# Patient Record
Sex: FEMALE | Race: WHITE | NOT HISPANIC OR LATINO
[De-identification: names, ages, dates, MRNs, and addresses within clinical notes are randomized per-mention and may not be internally consistent; named-entity substitution may affect disease eponyms.]

---

## 2017-09-27 ENCOUNTER — APPOINTMENT (OUTPATIENT)
Dept: OBGYN | Facility: CLINIC | Age: 37
End: 2017-09-27

## 2017-10-04 ENCOUNTER — APPOINTMENT (OUTPATIENT)
Dept: OBGYN | Facility: CLINIC | Age: 37
End: 2017-10-04
Payer: COMMERCIAL

## 2017-10-04 PROCEDURE — 76831 ECHO EXAM UTERUS: CPT

## 2017-10-04 PROCEDURE — 36415 COLL VENOUS BLD VENIPUNCTURE: CPT

## 2017-10-04 PROCEDURE — 81025 URINE PREGNANCY TEST: CPT

## 2017-10-04 PROCEDURE — 58340 CATHETER FOR HYSTEROGRAPHY: CPT

## 2017-11-28 ENCOUNTER — RESULT REVIEW (OUTPATIENT)
Age: 37
End: 2017-11-28

## 2017-11-29 ENCOUNTER — APPOINTMENT (OUTPATIENT)
Dept: OBGYN | Facility: CLINIC | Age: 37
End: 2017-11-29
Payer: COMMERCIAL

## 2017-11-29 PROCEDURE — 99395 PREV VISIT EST AGE 18-39: CPT

## 2018-01-08 ENCOUNTER — APPOINTMENT (OUTPATIENT)
Dept: OBGYN | Facility: CLINIC | Age: 38
End: 2018-01-08
Payer: COMMERCIAL

## 2018-01-08 PROCEDURE — 99213 OFFICE O/P EST LOW 20 MIN: CPT

## 2018-01-17 ENCOUNTER — APPOINTMENT (OUTPATIENT)
Dept: OBGYN | Facility: CLINIC | Age: 38
End: 2018-01-17

## 2018-01-25 ENCOUNTER — APPOINTMENT (OUTPATIENT)
Dept: OBGYN | Facility: CLINIC | Age: 38
End: 2018-01-25
Payer: COMMERCIAL

## 2018-01-25 PROCEDURE — 76831 ECHO EXAM UTERUS: CPT

## 2018-01-25 PROCEDURE — 81025 URINE PREGNANCY TEST: CPT

## 2018-01-25 PROCEDURE — 36415 COLL VENOUS BLD VENIPUNCTURE: CPT

## 2018-01-25 PROCEDURE — 58340 CATHETER FOR HYSTEROGRAPHY: CPT

## 2018-02-16 ENCOUNTER — APPOINTMENT (OUTPATIENT)
Dept: OBGYN | Facility: CLINIC | Age: 38
End: 2018-02-16

## 2018-05-03 ENCOUNTER — OUTPATIENT (OUTPATIENT)
Dept: OUTPATIENT SERVICES | Facility: HOSPITAL | Age: 38
LOS: 1 days | End: 2018-05-03
Payer: COMMERCIAL

## 2018-05-03 VITALS
HEIGHT: 59.5 IN | HEART RATE: 82 BPM | TEMPERATURE: 98 F | SYSTOLIC BLOOD PRESSURE: 103 MMHG | DIASTOLIC BLOOD PRESSURE: 69 MMHG | WEIGHT: 130.95 LBS | OXYGEN SATURATION: 98 % | RESPIRATION RATE: 16 BRPM

## 2018-05-03 DIAGNOSIS — N84.0 POLYP OF CORPUS UTERI: ICD-10-CM

## 2018-05-03 DIAGNOSIS — Z01.818 ENCOUNTER FOR OTHER PREPROCEDURAL EXAMINATION: ICD-10-CM

## 2018-05-03 DIAGNOSIS — N92.0 EXCESSIVE AND FREQUENT MENSTRUATION WITH REGULAR CYCLE: ICD-10-CM

## 2018-05-03 DIAGNOSIS — Z98.890 OTHER SPECIFIED POSTPROCEDURAL STATES: Chronic | ICD-10-CM

## 2018-05-03 DIAGNOSIS — Z98.891 HISTORY OF UTERINE SCAR FROM PREVIOUS SURGERY: Chronic | ICD-10-CM

## 2018-05-03 DIAGNOSIS — Z86.008 PERSONAL HISTORY OF IN-SITU NEOPLASM OF OTHER SITE: Chronic | ICD-10-CM

## 2018-05-03 LAB
ANION GAP SERPL CALC-SCNC: 11 MMOL/L — SIGNIFICANT CHANGE UP (ref 5–17)
BUN SERPL-MCNC: 14 MG/DL — SIGNIFICANT CHANGE UP (ref 7–23)
CALCIUM SERPL-MCNC: 9.6 MG/DL — SIGNIFICANT CHANGE UP (ref 8.4–10.5)
CHLORIDE SERPL-SCNC: 103 MMOL/L — SIGNIFICANT CHANGE UP (ref 96–108)
CO2 SERPL-SCNC: 23 MMOL/L — SIGNIFICANT CHANGE UP (ref 22–31)
CREAT SERPL-MCNC: 0.78 MG/DL — SIGNIFICANT CHANGE UP (ref 0.5–1.3)
GLUCOSE SERPL-MCNC: 84 MG/DL — SIGNIFICANT CHANGE UP (ref 70–99)
HCT VFR BLD CALC: 36 % — SIGNIFICANT CHANGE UP (ref 34.5–45)
HGB BLD-MCNC: 11.5 G/DL — SIGNIFICANT CHANGE UP (ref 11.5–15.5)
MCHC RBC-ENTMCNC: 27.4 PG — SIGNIFICANT CHANGE UP (ref 27–34)
MCHC RBC-ENTMCNC: 31.9 GM/DL — LOW (ref 32–36)
MCV RBC AUTO: 85.9 FL — SIGNIFICANT CHANGE UP (ref 80–100)
PLATELET # BLD AUTO: 160 K/UL — SIGNIFICANT CHANGE UP (ref 150–400)
POTASSIUM SERPL-MCNC: 4.6 MMOL/L — SIGNIFICANT CHANGE UP (ref 3.5–5.3)
POTASSIUM SERPL-SCNC: 4.6 MMOL/L — SIGNIFICANT CHANGE UP (ref 3.5–5.3)
RBC # BLD: 4.19 M/UL — SIGNIFICANT CHANGE UP (ref 3.8–5.2)
RBC # FLD: 14 % — SIGNIFICANT CHANGE UP (ref 10.3–14.5)
SODIUM SERPL-SCNC: 137 MMOL/L — SIGNIFICANT CHANGE UP (ref 135–145)
WBC # BLD: 4.4 K/UL — SIGNIFICANT CHANGE UP (ref 3.8–10.5)
WBC # FLD AUTO: 4.4 K/UL — SIGNIFICANT CHANGE UP (ref 3.8–10.5)

## 2018-05-03 PROCEDURE — G0463: CPT

## 2018-05-03 PROCEDURE — 80048 BASIC METABOLIC PNL TOTAL CA: CPT

## 2018-05-03 PROCEDURE — 85027 COMPLETE CBC AUTOMATED: CPT

## 2018-05-03 RX ORDER — LIDOCAINE HCL 20 MG/ML
0.2 VIAL (ML) INJECTION ONCE
Qty: 0 | Refills: 0 | Status: DISCONTINUED | OUTPATIENT
Start: 2018-05-10 | End: 2018-05-25

## 2018-05-03 RX ORDER — SODIUM CHLORIDE 9 MG/ML
3 INJECTION INTRAMUSCULAR; INTRAVENOUS; SUBCUTANEOUS EVERY 8 HOURS
Qty: 0 | Refills: 0 | Status: DISCONTINUED | OUTPATIENT
Start: 2018-05-10 | End: 2018-05-25

## 2018-05-03 NOTE — H&P PST ADULT - NSANTHOSAYNRD_GEN_A_CORE
No. MARCEL screening performed.  STOP BANG Legend: 0-2 = LOW Risk; 3-4 = INTERMEDIATE Risk; 5-8 = HIGH Risk

## 2018-05-03 NOTE — H&P PST ADULT - BLOOD AVOIDANCE/RESTRICTIONS, PROFILE
Anesthesia Evaluation     . Pt has had prior anesthetic. Type: General, MAC and Regional           ROS/MED HX    ENT/Pulmonary:     (+)allergic rhinitis, tobacco use, Past use quit 2014 packs/day  , . .    Neurologic:       Cardiovascular:     (+) Dyslipidemia, hypertension--CAD, --stent,1 Drug Eluting Stent .. : . . . :. .      (-) angina and anginaTaking blood thinners: 2014 Stent to circumflex.   METS/Exercise Tolerance:     Hematologic:     (+) Anemia, -      Musculoskeletal:         GI/Hepatic:     (+) GERD       Renal/Genitourinary:         Endo:     (+) Obesity, .      Psychiatric:     (+) psychiatric history anxiety      Infectious Disease:         Malignancy:   (+) Malignancy History of Lung          Other:                     Physical Exam  Normal systems: cardiovascular    Airway   Mallampati: II  TM distance: >3 FB  Neck ROM: full    Dental     Cardiovascular   Rhythm and rate: regular and normal      Pulmonary    breath sounds clear to auscultation                    Anesthesia Plan      History & Physical Review  History and physical reviewed and following examination; no interval change.    ASA Status:  3 .    NPO Status:  > 6 hours    Plan for MAC Reason for MAC:  Deep or markedly invasive procedure (G8)  PONV prophylaxis:  Ondansetron (or other 5HT-3) and Dexamethasone or Solumedrol       Postoperative Care      Consents  Anesthetic plan, risks, benefits and alternatives discussed with:  Patient..                          .   none

## 2018-05-03 NOTE — H&P PST ADULT - HISTORY OF PRESENT ILLNESS
This is a 38 y/o female with  PMH: ('16) Melanoma insitu RUE: excised. Current dx: recurrent Uterine Polyp. Scheduled: D+C/ Diagnostic / Possible Operative Hysteroscopy/ Possible Polypectomy.

## 2018-05-03 NOTE — H&P PST ADULT - PSH
H/O melanoma in situ  2016  RUE  History of  section  '   Status post dilation and curettage  '    Excison Uterine Polyp

## 2018-05-09 ENCOUNTER — TRANSCRIPTION ENCOUNTER (OUTPATIENT)
Age: 38
End: 2018-05-09

## 2018-05-10 ENCOUNTER — OUTPATIENT (OUTPATIENT)
Dept: OUTPATIENT SERVICES | Facility: HOSPITAL | Age: 38
LOS: 1 days | End: 2018-05-10
Payer: COMMERCIAL

## 2018-05-10 ENCOUNTER — RESULT REVIEW (OUTPATIENT)
Age: 38
End: 2018-05-10

## 2018-05-10 ENCOUNTER — APPOINTMENT (OUTPATIENT)
Dept: OBGYN | Facility: HOSPITAL | Age: 38
End: 2018-05-10

## 2018-05-10 VITALS
RESPIRATION RATE: 16 BRPM | TEMPERATURE: 98 F | SYSTOLIC BLOOD PRESSURE: 103 MMHG | HEART RATE: 82 BPM | WEIGHT: 130.95 LBS | HEIGHT: 59.5 IN | DIASTOLIC BLOOD PRESSURE: 69 MMHG | OXYGEN SATURATION: 98 %

## 2018-05-10 VITALS
DIASTOLIC BLOOD PRESSURE: 55 MMHG | RESPIRATION RATE: 15 BRPM | OXYGEN SATURATION: 100 % | SYSTOLIC BLOOD PRESSURE: 112 MMHG | HEART RATE: 94 BPM

## 2018-05-10 DIAGNOSIS — Z98.890 OTHER SPECIFIED POSTPROCEDURAL STATES: Chronic | ICD-10-CM

## 2018-05-10 DIAGNOSIS — N92.0 EXCESSIVE AND FREQUENT MENSTRUATION WITH REGULAR CYCLE: ICD-10-CM

## 2018-05-10 DIAGNOSIS — Z86.008 PERSONAL HISTORY OF IN-SITU NEOPLASM OF OTHER SITE: Chronic | ICD-10-CM

## 2018-05-10 DIAGNOSIS — N84.0 POLYP OF CORPUS UTERI: ICD-10-CM

## 2018-05-10 DIAGNOSIS — Z98.891 HISTORY OF UTERINE SCAR FROM PREVIOUS SURGERY: Chronic | ICD-10-CM

## 2018-05-10 PROCEDURE — 88305 TISSUE EXAM BY PATHOLOGIST: CPT | Mod: 26

## 2018-05-10 PROCEDURE — 58558 HYSTEROSCOPY BIOPSY: CPT

## 2018-05-10 PROCEDURE — 88305 TISSUE EXAM BY PATHOLOGIST: CPT

## 2018-05-10 RX ORDER — CELECOXIB 200 MG/1
200 CAPSULE ORAL ONCE
Qty: 0 | Refills: 0 | Status: DISCONTINUED | OUTPATIENT
Start: 2018-05-10 | End: 2018-05-25

## 2018-05-10 RX ORDER — SODIUM CHLORIDE 9 MG/ML
1000 INJECTION, SOLUTION INTRAVENOUS
Qty: 0 | Refills: 0 | Status: DISCONTINUED | OUTPATIENT
Start: 2018-05-10 | End: 2018-05-25

## 2018-05-10 RX ORDER — FAMOTIDINE 10 MG/ML
0 INJECTION INTRAVENOUS
Qty: 0 | Refills: 0 | COMMUNITY

## 2018-05-10 RX ORDER — ACETAMINOPHEN 500 MG
1000 TABLET ORAL ONCE
Qty: 0 | Refills: 0 | Status: COMPLETED | OUTPATIENT
Start: 2018-05-10 | End: 2018-05-10

## 2018-05-10 RX ORDER — CELECOXIB 200 MG/1
200 CAPSULE ORAL ONCE
Qty: 0 | Refills: 0 | Status: COMPLETED | OUTPATIENT
Start: 2018-05-10 | End: 2018-05-10

## 2018-05-10 RX ORDER — ONDANSETRON 8 MG/1
4 TABLET, FILM COATED ORAL ONCE
Qty: 0 | Refills: 0 | Status: DISCONTINUED | OUTPATIENT
Start: 2018-05-10 | End: 2018-05-25

## 2018-05-10 RX ORDER — OXYCODONE HYDROCHLORIDE 5 MG/1
5 TABLET ORAL ONCE
Qty: 0 | Refills: 0 | Status: DISCONTINUED | OUTPATIENT
Start: 2018-05-10 | End: 2018-05-10

## 2018-05-10 RX ADMIN — Medication 1000 MILLIGRAM(S): at 11:48

## 2018-05-10 RX ADMIN — SODIUM CHLORIDE 3 MILLILITER(S): 9 INJECTION INTRAMUSCULAR; INTRAVENOUS; SUBCUTANEOUS at 11:50

## 2018-05-10 RX ADMIN — CELECOXIB 200 MILLIGRAM(S): 200 CAPSULE ORAL at 11:48

## 2018-05-10 NOTE — ASU DISCHARGE PLAN (ADULT/PEDIATRIC). - NOTIFY
Unable to Urinate/Bleeding that does not stop/Persistent Nausea and Vomiting/Pain not relieved by Medications/GYN Fever>100.4/Inability to Tolerate Liquids or Foods

## 2018-05-10 NOTE — ASU DISCHARGE PLAN (ADULT/PEDIATRIC). - MEDICATION SUMMARY - MEDICATIONS TO STOP TAKING
I will STOP taking the medications listed below when I get home from the hospital:    famotidine 20 mg oral tablet  -- 1 tab HS before and 1 tab am of surgery

## 2018-05-10 NOTE — ASU DISCHARGE PLAN (ADULT/PEDIATRIC). - ITEMS TO FOLLOWUP WITH YOUR PHYSICIAN'S
Expect abdominal cramping/pain and spotting for the next week. Take ibuprofen and Tylenol for cramping. Use a pad as needed. Call your physician or go to the emergency room if you experience any of the following: heavy vaginal bleeding (soaking more than 2 pads in 1 hour for 2 hours), fever, chills, nausea, vomiting, or pain that is not controlled by medication. Follow-up with Dr. Mak in 2 weeks.

## 2018-05-10 NOTE — ASU DISCHARGE PLAN (ADULT/PEDIATRIC). - ACTIVITY LEVEL
no tub baths/no tampons/no intercourse/no heavy lifting/weight bearing as tolerated/nothing per vagina/no douching/nothing per rectum x 2 weeks/nothing per vagina/no intercourse/no tampons/no heavy lifting/nothing per rectum/no tub baths/weight bearing as tolerated/no douching

## 2018-05-10 NOTE — BRIEF OPERATIVE NOTE - PROCEDURE
<<-----Click on this checkbox to enter Procedure Hysteroscopy with dilation and curettage of uterus  05/10/2018    Active  RBUESER

## 2018-05-10 NOTE — BRIEF OPERATIVE NOTE - OPERATION/FINDINGS
Exam under anesthesia revealed a small anteverted, mobile uterus. On hysteroscopy, scant secretory endometrium vs polypoid tissue visualized. No discrete polyp noted. Ostia WNL b/l. Sharp curettage performed with minimal bleeding.
Cervix dilated to 7mm. Bilateral ostia visualized. Normal appearing endometrial cavity. Excellent hemostasis.

## 2018-05-10 NOTE — ASU DISCHARGE PLAN (ADULT/PEDIATRIC). - MEDICATION SUMMARY - MEDICATIONS TO TAKE
I will START or STAY ON the medications listed below when I get home from the hospital:    Tylenol 325 mg oral tablet  -- 2 tab(s) by mouth every 4 hours, As Needed  -- Indication: For Moderate pain, as needed    ibuprofen 300 mg oral tablet  -- 2 tab(s) by mouth every 6 hours, As Needed  -- Indication: For Moderate pain, as needed

## 2018-05-10 NOTE — BRIEF OPERATIVE NOTE - PROCEDURE
<<-----Click on this checkbox to enter Procedure Diagnostic hysteroscopy with dilation and curettage of uterus  05/10/2018    Active  SALKASAB

## 2018-05-14 LAB — SURGICAL PATHOLOGY STUDY: SIGNIFICANT CHANGE UP

## 2018-05-19 ENCOUNTER — TRANSCRIPTION ENCOUNTER (OUTPATIENT)
Age: 38
End: 2018-05-19

## 2018-05-23 ENCOUNTER — APPOINTMENT (OUTPATIENT)
Dept: OBGYN | Facility: CLINIC | Age: 38
End: 2018-05-23
Payer: COMMERCIAL

## 2018-05-23 PROCEDURE — 99213 OFFICE O/P EST LOW 20 MIN: CPT

## 2018-10-23 PROBLEM — Z86.008 PERSONAL HISTORY OF IN-SITU NEOPLASM OF OTHER SITE: Chronic | Status: ACTIVE | Noted: 2018-05-03

## 2018-10-23 PROBLEM — N84.0 POLYP OF CORPUS UTERI: Chronic | Status: ACTIVE | Noted: 2018-05-03

## 2019-01-02 ENCOUNTER — RESULT REVIEW (OUTPATIENT)
Age: 39
End: 2019-01-02

## 2019-01-03 ENCOUNTER — APPOINTMENT (OUTPATIENT)
Dept: OBGYN | Facility: CLINIC | Age: 39
End: 2019-01-03
Payer: COMMERCIAL

## 2019-01-03 PROCEDURE — 99395 PREV VISIT EST AGE 18-39: CPT

## 2019-05-10 ENCOUNTER — APPOINTMENT (OUTPATIENT)
Dept: OBGYN | Facility: CLINIC | Age: 39
End: 2019-05-10
Payer: COMMERCIAL

## 2019-05-10 PROCEDURE — 99213 OFFICE O/P EST LOW 20 MIN: CPT

## 2019-06-04 ENCOUNTER — APPOINTMENT (OUTPATIENT)
Dept: ULTRASOUND IMAGING | Facility: IMAGING CENTER | Age: 39
End: 2019-06-04
Payer: COMMERCIAL

## 2019-06-04 ENCOUNTER — OUTPATIENT (OUTPATIENT)
Dept: OUTPATIENT SERVICES | Facility: HOSPITAL | Age: 39
LOS: 1 days | End: 2019-06-04
Payer: COMMERCIAL

## 2019-06-04 DIAGNOSIS — Z98.890 OTHER SPECIFIED POSTPROCEDURAL STATES: Chronic | ICD-10-CM

## 2019-06-04 DIAGNOSIS — Z00.8 ENCOUNTER FOR OTHER GENERAL EXAMINATION: ICD-10-CM

## 2019-06-04 DIAGNOSIS — Z86.008 PERSONAL HISTORY OF IN-SITU NEOPLASM OF OTHER SITE: Chronic | ICD-10-CM

## 2019-06-04 DIAGNOSIS — Z98.891 HISTORY OF UTERINE SCAR FROM PREVIOUS SURGERY: Chronic | ICD-10-CM

## 2019-06-04 PROCEDURE — 76830 TRANSVAGINAL US NON-OB: CPT

## 2019-06-04 PROCEDURE — 76856 US EXAM PELVIC COMPLETE: CPT | Mod: 26

## 2019-06-04 PROCEDURE — 76830 TRANSVAGINAL US NON-OB: CPT | Mod: 26

## 2019-06-04 PROCEDURE — 76856 US EXAM PELVIC COMPLETE: CPT

## 2020-01-07 ENCOUNTER — TRANSCRIPTION ENCOUNTER (OUTPATIENT)
Age: 40
End: 2020-01-07

## 2020-02-05 ENCOUNTER — RESULT REVIEW (OUTPATIENT)
Age: 40
End: 2020-02-05

## 2020-02-06 ENCOUNTER — APPOINTMENT (OUTPATIENT)
Dept: OBGYN | Facility: CLINIC | Age: 40
End: 2020-02-06
Payer: COMMERCIAL

## 2020-02-06 PROCEDURE — 36415 COLL VENOUS BLD VENIPUNCTURE: CPT

## 2020-02-06 PROCEDURE — 99395 PREV VISIT EST AGE 18-39: CPT

## 2020-02-15 ENCOUNTER — FORM ENCOUNTER (OUTPATIENT)
Age: 40
End: 2020-02-15

## 2020-03-02 ENCOUNTER — FORM ENCOUNTER (OUTPATIENT)
Age: 40
End: 2020-03-02

## 2020-08-13 ENCOUNTER — FORM ENCOUNTER (OUTPATIENT)
Age: 40
End: 2020-08-13

## 2020-08-14 ENCOUNTER — APPOINTMENT (OUTPATIENT)
Dept: OBGYN | Facility: CLINIC | Age: 40
End: 2020-08-14
Payer: COMMERCIAL

## 2020-08-14 ENCOUNTER — FORM ENCOUNTER (OUTPATIENT)
Age: 40
End: 2020-08-14

## 2020-08-14 PROCEDURE — 76831 ECHO EXAM UTERUS: CPT

## 2020-08-14 PROCEDURE — 36415 COLL VENOUS BLD VENIPUNCTURE: CPT

## 2020-08-14 PROCEDURE — 58340 CATHETER FOR HYSTEROGRAPHY: CPT

## 2020-08-14 PROCEDURE — 81025 URINE PREGNANCY TEST: CPT

## 2020-09-08 DIAGNOSIS — Z01.818 ENCOUNTER FOR OTHER PREPROCEDURAL EXAMINATION: ICD-10-CM

## 2020-09-12 ENCOUNTER — APPOINTMENT (OUTPATIENT)
Dept: DISASTER EMERGENCY | Facility: CLINIC | Age: 40
End: 2020-09-12

## 2020-09-14 ENCOUNTER — APPOINTMENT (OUTPATIENT)
Dept: OBGYN | Facility: HOSPITAL | Age: 40
End: 2020-09-14

## 2020-09-21 ENCOUNTER — APPOINTMENT (OUTPATIENT)
Dept: OBGYN | Facility: CLINIC | Age: 40
End: 2020-09-21

## 2020-11-17 ENCOUNTER — OUTPATIENT (OUTPATIENT)
Dept: OUTPATIENT SERVICES | Facility: HOSPITAL | Age: 40
LOS: 1 days | End: 2020-11-17
Payer: COMMERCIAL

## 2020-11-17 VITALS
HEART RATE: 80 BPM | DIASTOLIC BLOOD PRESSURE: 88 MMHG | OXYGEN SATURATION: 97 % | SYSTOLIC BLOOD PRESSURE: 128 MMHG | TEMPERATURE: 98 F | WEIGHT: 145.95 LBS | HEIGHT: 59 IN | RESPIRATION RATE: 14 BRPM

## 2020-11-17 DIAGNOSIS — Z98.890 OTHER SPECIFIED POSTPROCEDURAL STATES: Chronic | ICD-10-CM

## 2020-11-17 DIAGNOSIS — N84.0 POLYP OF CORPUS UTERI: ICD-10-CM

## 2020-11-17 DIAGNOSIS — Z01.818 ENCOUNTER FOR OTHER PREPROCEDURAL EXAMINATION: ICD-10-CM

## 2020-11-17 DIAGNOSIS — Z98.891 HISTORY OF UTERINE SCAR FROM PREVIOUS SURGERY: Chronic | ICD-10-CM

## 2020-11-17 DIAGNOSIS — N92.0 EXCESSIVE AND FREQUENT MENSTRUATION WITH REGULAR CYCLE: ICD-10-CM

## 2020-11-17 DIAGNOSIS — Z86.008 PERSONAL HISTORY OF IN-SITU NEOPLASM OF OTHER SITE: Chronic | ICD-10-CM

## 2020-11-17 LAB
ANION GAP SERPL CALC-SCNC: 13 MMOL/L — SIGNIFICANT CHANGE UP (ref 5–17)
BUN SERPL-MCNC: 9 MG/DL — SIGNIFICANT CHANGE UP (ref 7–23)
CALCIUM SERPL-MCNC: 9.4 MG/DL — SIGNIFICANT CHANGE UP (ref 8.4–10.5)
CHLORIDE SERPL-SCNC: 100 MMOL/L — SIGNIFICANT CHANGE UP (ref 96–108)
CO2 SERPL-SCNC: 23 MMOL/L — SIGNIFICANT CHANGE UP (ref 22–31)
CREAT SERPL-MCNC: 0.75 MG/DL — SIGNIFICANT CHANGE UP (ref 0.5–1.3)
GLUCOSE SERPL-MCNC: 96 MG/DL — SIGNIFICANT CHANGE UP (ref 70–99)
HCT VFR BLD CALC: 38.1 % — SIGNIFICANT CHANGE UP (ref 34.5–45)
HGB BLD-MCNC: 12.3 G/DL — SIGNIFICANT CHANGE UP (ref 11.5–15.5)
MCHC RBC-ENTMCNC: 27 PG — SIGNIFICANT CHANGE UP (ref 27–34)
MCHC RBC-ENTMCNC: 32.3 GM/DL — SIGNIFICANT CHANGE UP (ref 32–36)
MCV RBC AUTO: 83.6 FL — SIGNIFICANT CHANGE UP (ref 80–100)
NRBC # BLD: 0 /100 WBCS — SIGNIFICANT CHANGE UP (ref 0–0)
PLATELET # BLD AUTO: 200 K/UL — SIGNIFICANT CHANGE UP (ref 150–400)
POTASSIUM SERPL-MCNC: 4.4 MMOL/L — SIGNIFICANT CHANGE UP (ref 3.5–5.3)
POTASSIUM SERPL-SCNC: 4.4 MMOL/L — SIGNIFICANT CHANGE UP (ref 3.5–5.3)
RBC # BLD: 4.56 M/UL — SIGNIFICANT CHANGE UP (ref 3.8–5.2)
RBC # FLD: 14 % — SIGNIFICANT CHANGE UP (ref 10.3–14.5)
SARS-COV-2 RNA SPEC QL NAA+PROBE: SIGNIFICANT CHANGE UP
SODIUM SERPL-SCNC: 136 MMOL/L — SIGNIFICANT CHANGE UP (ref 135–145)
WBC # BLD: 5.9 K/UL — SIGNIFICANT CHANGE UP (ref 3.8–10.5)
WBC # FLD AUTO: 5.9 K/UL — SIGNIFICANT CHANGE UP (ref 3.8–10.5)

## 2020-11-17 PROCEDURE — 85027 COMPLETE CBC AUTOMATED: CPT

## 2020-11-17 PROCEDURE — G0463: CPT

## 2020-11-17 PROCEDURE — U0003: CPT

## 2020-11-17 PROCEDURE — 80048 BASIC METABOLIC PNL TOTAL CA: CPT

## 2020-11-17 RX ORDER — IBUPROFEN 200 MG
2 TABLET ORAL
Qty: 0 | Refills: 0 | DISCHARGE

## 2020-11-17 RX ORDER — SODIUM CHLORIDE 9 MG/ML
3 INJECTION INTRAMUSCULAR; INTRAVENOUS; SUBCUTANEOUS EVERY 8 HOURS
Refills: 0 | Status: DISCONTINUED | OUTPATIENT
Start: 2020-11-20 | End: 2020-12-04

## 2020-11-17 RX ORDER — LIDOCAINE HCL 20 MG/ML
0.2 VIAL (ML) INJECTION ONCE
Refills: 0 | Status: DISCONTINUED | OUTPATIENT
Start: 2020-11-20 | End: 2020-12-04

## 2020-11-17 RX ORDER — ACETAMINOPHEN 500 MG
2 TABLET ORAL
Qty: 0 | Refills: 0 | DISCHARGE

## 2020-11-17 NOTE — H&P PST ADULT - MUSCULOSKELETAL
details… No joint pain, swelling or deformity; no limitation of movement detailed exam normal strength/no calf tenderness

## 2020-11-17 NOTE — H&P PST ADULT - HISTORY OF PRESENT ILLNESS
39 y/o  female, LMP 20, with PMHx of Melanoma in situ of RUE (s/p excision ), uterine polyp- s/p D&C, polypectomy x 2 (, ), c/o intermittent vaginal bleeding and cramping, bloating. S/P ultrasound revealed uterine polyp. Evaluated by Dr. Mak. Presents to [facility] for a scheduled D&C, diagnostic hysteroscopy, polypectomy, possible operative 2020.     ***Covid PCR done today at [facility]  ***Of note, patient had rash of upper chest, wrists, hips, stomach and was seen by Dr. Mayo, (derm/onc) - pt using Triamcinolone cream- rash now resolved. Pt reports that Dr. Mak is aware. 41 y/o  female, LMP 20, with PMHx of melanoma in situ of RUE (s/p excision 2016), uterine polyp- s/p D&C, polypectomy x 2 (, ), c/o intermittent vaginal bleeding, cramping, and bloating. S/P ultrasound revealed uterine polyp. Evaluated by Dr. Mak. Presents to [facility] for a scheduled D&C, diagnostic hysteroscopy, polypectomy, possible operative 2020.     ***Covid PCR done today at [facility]  ***Of note, patient had rash of upper chest, wrists, hips, stomach and was seen by Dr. Mayo, (derm/onc) - pt using Triamcinolone cream- rash now resolved. Pt reports that Dr. Mak is aware. 39 y/o  female, LMP 20, with PMHx of melanoma in situ of RUE (s/p excision 2016), uterine polyp- s/p D&C, polypectomy x 2 (, ), c/o intermittent vaginal bleeding, cramping, and bloating. S/P ultrasound revealed uterine polyp. Evaluated by Dr. Mak. Presents to [facility] for a scheduled D&C, diagnostic hysteroscopy, polypectomy, possible operative 2020.     ***Covid PCR done today at [facility]  ***Of note, patient had rash of upper chest, wrists, hips, stomach and was seen by Dr. Mayo, (derm/onc) - pt using Triamcinolone cream- rash now resolved. Pt reports Dr. Mak is aware.

## 2020-11-17 NOTE — H&P PST ADULT - NSICDXPROBLEM_GEN_ALL_CORE_FT
PROBLEM DIAGNOSES  Problem: Uterine polyp  Assessment and Plan: Scheduled D&C, diagnostic hysteroscopy, polypectomy 11/20/20  Pre-op instructions given to pt, lab work / Covid PCR sent at PST

## 2020-11-17 NOTE — H&P PST ADULT - NSICDXPASTSURGICALHX_GEN_ALL_CORE_FT
PAST SURGICAL HISTORY:  H/O melanoma in situ 2016  RUE    History of  section '     Status post dilation and curettage x 2  (, 2018)   Excison Uterine Polyp

## 2020-11-19 ENCOUNTER — TRANSCRIPTION ENCOUNTER (OUTPATIENT)
Age: 40
End: 2020-11-19

## 2020-11-20 ENCOUNTER — OUTPATIENT (OUTPATIENT)
Dept: OUTPATIENT SERVICES | Facility: HOSPITAL | Age: 40
LOS: 1 days | End: 2020-11-20
Payer: COMMERCIAL

## 2020-11-20 ENCOUNTER — RESULT REVIEW (OUTPATIENT)
Age: 40
End: 2020-11-20

## 2020-11-20 ENCOUNTER — APPOINTMENT (OUTPATIENT)
Dept: OBGYN | Facility: HOSPITAL | Age: 40
End: 2020-11-20

## 2020-11-20 VITALS
OXYGEN SATURATION: 100 % | RESPIRATION RATE: 16 BRPM | HEART RATE: 89 BPM | SYSTOLIC BLOOD PRESSURE: 134 MMHG | TEMPERATURE: 98 F | DIASTOLIC BLOOD PRESSURE: 76 MMHG

## 2020-11-20 VITALS
HEART RATE: 78 BPM | TEMPERATURE: 98 F | RESPIRATION RATE: 16 BRPM | DIASTOLIC BLOOD PRESSURE: 75 MMHG | SYSTOLIC BLOOD PRESSURE: 114 MMHG | OXYGEN SATURATION: 100 % | WEIGHT: 145.95 LBS | HEIGHT: 59 IN

## 2020-11-20 DIAGNOSIS — Z86.008 PERSONAL HISTORY OF IN-SITU NEOPLASM OF OTHER SITE: Chronic | ICD-10-CM

## 2020-11-20 DIAGNOSIS — Z01.818 ENCOUNTER FOR OTHER PREPROCEDURAL EXAMINATION: ICD-10-CM

## 2020-11-20 DIAGNOSIS — Z98.891 HISTORY OF UTERINE SCAR FROM PREVIOUS SURGERY: Chronic | ICD-10-CM

## 2020-11-20 DIAGNOSIS — Z98.890 OTHER SPECIFIED POSTPROCEDURAL STATES: Chronic | ICD-10-CM

## 2020-11-20 DIAGNOSIS — N92.0 EXCESSIVE AND FREQUENT MENSTRUATION WITH REGULAR CYCLE: ICD-10-CM

## 2020-11-20 PROCEDURE — 58558 HYSTEROSCOPY BIOPSY: CPT

## 2020-11-20 PROCEDURE — 88305 TISSUE EXAM BY PATHOLOGIST: CPT

## 2020-11-20 PROCEDURE — 88305 TISSUE EXAM BY PATHOLOGIST: CPT | Mod: 26

## 2020-11-20 RX ORDER — ONDANSETRON 8 MG/1
4 TABLET, FILM COATED ORAL ONCE
Refills: 0 | Status: DISCONTINUED | OUTPATIENT
Start: 2020-11-20 | End: 2020-12-04

## 2020-11-20 RX ORDER — SODIUM CHLORIDE 9 MG/ML
1000 INJECTION, SOLUTION INTRAVENOUS
Refills: 0 | Status: DISCONTINUED | OUTPATIENT
Start: 2020-11-20 | End: 2020-12-04

## 2020-11-20 RX ORDER — IBUPROFEN 200 MG
1 TABLET ORAL
Qty: 0 | Refills: 0 | DISCHARGE

## 2020-11-20 RX ORDER — OXYCODONE HYDROCHLORIDE 5 MG/1
5 TABLET ORAL ONCE
Refills: 0 | Status: DISCONTINUED | OUTPATIENT
Start: 2020-11-20 | End: 2020-11-20

## 2020-11-20 NOTE — ASU DISCHARGE PLAN (ADULT/PEDIATRIC) - ACTIVITY LEVEL
No sports/gym/Nothing per rectum/Nothing per vagina/No intercourse/No excercise/No douching/No heavy lifting/No tub baths/No tampons

## 2020-11-20 NOTE — ASU PATIENT PROFILE, ADULT - PSH
H/O melanoma in situ  2016  RUE  History of  section  '   Status post dilation and curettage  x 2  (, )   Excison Uterine Polyp

## 2020-11-20 NOTE — BRIEF OPERATIVE NOTE - NSICDXBRIEFPROCEDURE_GEN_ALL_CORE_FT
PROCEDURES:  Operative hysteroscopy with fluid management system 20-Nov-2020 15:38:07  Estrellita Mak

## 2020-11-20 NOTE — ASU DISCHARGE PLAN (ADULT/PEDIATRIC) - CARE PROVIDER_API CALL
Estrellita Mak)  OBSGYN  [address]  [place] [address]  Phone: [phone]  Fax: [phone]  Established Patient  Follow Up Time: 2 weeks

## 2020-11-20 NOTE — BRIEF OPERATIVE NOTE - OPERATION/FINDINGS
Cervix dilated to 7mm. Diagnostic hysteroscopy performed. Bilateral ostia visualized. Two areas w polypoid tissue noted. Operative hysteroscopy performed. Polyps ressected using resectoscope. D+C performed. Deficit 550cc. Excellent hemostasis. EBL 25 cc.

## 2020-11-20 NOTE — ASU DISCHARGE PLAN (ADULT/PEDIATRIC) - NURSING INSTRUCTIONS
Next dose of Tylenol will be on or after ___0540PM________ ,today/tonight and every 6 hours afterwards for pain management, do not take any Tylenol containing products until this time. Your first dose of Tylenol was given at ___1140AM________. Do not exceed more than 4000mg of Tylenol in one 24 hour setting. If no contraindications, you may alternate with Ibuprofen or Naproxen 3 hours after dose of Tylenol. Ibuprofen can be taken every 6 hours. Naproxen may be taken every 12 hours.

## 2020-11-20 NOTE — ASU DISCHARGE PLAN (ADULT/PEDIATRIC) - CALL YOUR DOCTOR IF YOU HAVE ANY OF THE FOLLOWING:
Nausea and vomiting that does not stop/Unable to urinate/Swelling that gets worse/Pain not relieved by Medications/Bleeding that does not stop/Wound/Surgical Site with redness, or foul smelling discharge or pus/Fever greater than (need to indicate Fahrenheit or Celsius)/Inability to tolerate liquids or foods/Increased irritability or sluggishness/Excessive diarrhea/Numbness, tingling, color or temperature change to extremity

## 2020-11-24 LAB — SURGICAL PATHOLOGY STUDY: SIGNIFICANT CHANGE UP

## 2020-11-30 ENCOUNTER — FORM ENCOUNTER (OUTPATIENT)
Age: 40
End: 2020-11-30

## 2020-12-02 ENCOUNTER — APPOINTMENT (OUTPATIENT)
Dept: OBGYN | Facility: CLINIC | Age: 40
End: 2020-12-02
Payer: COMMERCIAL

## 2020-12-02 PROCEDURE — 99024 POSTOP FOLLOW-UP VISIT: CPT

## 2021-03-16 ENCOUNTER — FORM ENCOUNTER (OUTPATIENT)
Age: 41
End: 2021-03-16

## 2021-03-16 ENCOUNTER — RESULT REVIEW (OUTPATIENT)
Age: 41
End: 2021-03-16

## 2021-03-17 ENCOUNTER — FORM ENCOUNTER (OUTPATIENT)
Age: 41
End: 2021-03-17

## 2021-03-17 ENCOUNTER — APPOINTMENT (OUTPATIENT)
Dept: OBGYN | Facility: CLINIC | Age: 41
End: 2021-03-17
Payer: COMMERCIAL

## 2021-03-17 PROCEDURE — 99395 PREV VISIT EST AGE 18-39: CPT

## 2021-03-17 PROCEDURE — 99072 ADDL SUPL MATRL&STAF TM PHE: CPT

## 2021-03-18 ENCOUNTER — FORM ENCOUNTER (OUTPATIENT)
Age: 41
End: 2021-03-18

## 2021-03-23 ENCOUNTER — FORM ENCOUNTER (OUTPATIENT)
Age: 41
End: 2021-03-23

## 2021-03-25 ENCOUNTER — FORM ENCOUNTER (OUTPATIENT)
Age: 41
End: 2021-03-25

## 2022-01-26 DIAGNOSIS — Z78.9 OTHER SPECIFIED HEALTH STATUS: ICD-10-CM

## 2022-01-26 DIAGNOSIS — Z12.72 ENCOUNTER FOR SCREENING FOR MALIGNANT NEOPLASM OF VAGINA: ICD-10-CM

## 2022-03-17 ENCOUNTER — APPOINTMENT (OUTPATIENT)
Dept: OBGYN | Facility: CLINIC | Age: 42
End: 2022-03-17
Payer: COMMERCIAL

## 2022-03-17 VITALS
HEIGHT: 59 IN | DIASTOLIC BLOOD PRESSURE: 81 MMHG | WEIGHT: 126 LBS | SYSTOLIC BLOOD PRESSURE: 124 MMHG | BODY MASS INDEX: 25.4 KG/M2

## 2022-03-17 DIAGNOSIS — Z01.419 ENCOUNTER FOR GYNECOLOGICAL EXAMINATION (GENERAL) (ROUTINE) W/OUT ABNORMAL FINDINGS: ICD-10-CM

## 2022-03-17 PROCEDURE — 99395 PREV VISIT EST AGE 18-39: CPT

## 2022-03-17 NOTE — HISTORY OF PRESENT ILLNESS
[TextBox_4] : 42yo  presents for routine gyn exam.  Regular cycle, no complaints.  Pt. is  ([employer] school), Also [employer] fan).\par \par Info. From prior EMR:\par Demographics:  Race: White Ethnicity: Not  or  Native Language: English \par : 1  Para:  1 0 0 1 \par OB History: C/S at [facility] (pushed for 1.5 hours) \par \par GYN History: No significant GYN history. \par GYN \par  endo polyp s/p D+C/hysteroscopic polypectomy on 18 Sexually Active  \par \par Type of Contraception: condoms \par PMH\par  No significant past medical history. \par Surgical History: c/s, s/p D+C/hysteroscopic polypectomy on 18 \par Allergies: NKDA,\par Current Medications: Prescribed/Suppliments/OTC NONE \par Medications Verified Medications Verified \par Last PAP: 2020 -- PAP wnl, hpv neg Last Breast Sono:: - breast sono wnl Last OB Sono: 08/15/2020 \par Family Hx\par  Diabetes: GF \par  Personal history of blood clots/DVT/PE: no  \par  Personal history of conditions causing increased risk of blood clots/DVT/PE: no  \par  Family history of blood clots/DVT/PE: no  \par  Family history of conditions causing increased risk of blood clots/DVT/PE: no\par  [PapSmeardate] : 3/2021 [TextBox_31] : wnl

## 2022-03-17 NOTE — PLAN
Hypertension, pediatric
[FreeTextEntry1] : HCM\par -SBE\par -pap & HPV\par -mammo\par RTO 1 year\par

## 2022-03-20 LAB — HPV HIGH+LOW RISK DNA PNL CVX: NOT DETECTED

## 2022-03-31 LAB — CYTOLOGY CVX/VAG DOC THIN PREP: NORMAL
